# Patient Record
Sex: MALE | Race: WHITE | NOT HISPANIC OR LATINO | Employment: OTHER | URBAN - METROPOLITAN AREA
[De-identification: names, ages, dates, MRNs, and addresses within clinical notes are randomized per-mention and may not be internally consistent; named-entity substitution may affect disease eponyms.]

---

## 2017-01-27 ENCOUNTER — OFFICE VISIT (OUTPATIENT)
Dept: UROLOGY | Facility: CLINIC | Age: 77
End: 2017-01-27
Payer: MEDICARE

## 2017-01-27 VITALS — WEIGHT: 182 LBS

## 2017-01-27 DIAGNOSIS — C67.9 MALIGNANT NEOPLASM OF URINARY BLADDER, UNSPECIFIED SITE: Primary | ICD-10-CM

## 2017-01-27 LAB
BILIRUB SERPL-MCNC: NORMAL MG/DL
BLOOD URINE, POC: NORMAL
COLOR, POC UA: YELLOW
GLUCOSE UR QL STRIP: NORMAL
KETONES UR QL STRIP: NORMAL
LEUKOCYTE ESTERASE URINE, POC: NORMAL
NITRITE, POC UA: NORMAL
PH, POC UA: 7
PROTEIN, POC: NORMAL
SPECIFIC GRAVITY, POC UA: 1.01
UROBILINOGEN, POC UA: NORMAL

## 2017-01-27 PROCEDURE — 81002 URINALYSIS NONAUTO W/O SCOPE: CPT | Mod: S$GLB,,, | Performed by: UROLOGY

## 2017-01-27 PROCEDURE — 99499 UNLISTED E&M SERVICE: CPT | Mod: S$GLB,,, | Performed by: UROLOGY

## 2017-01-27 PROCEDURE — 99999 PR PBB SHADOW E&M-EST. PATIENT-LVL I: CPT | Mod: PBBFAC,,, | Performed by: UROLOGY

## 2017-01-27 PROCEDURE — 52000 CYSTOURETHROSCOPY: CPT | Mod: S$GLB,,, | Performed by: UROLOGY

## 2017-01-27 NOTE — MR AVS SNAPSHOT
[facility]  [address] [facility]  [place] [address]  Phone: [phone]  Fax: [phone]                  Lorenzo Arreguin   2017 10:40 AM   Office Visit    Description:  Male : 1940   Provider:  Carlitos Hernandez IV, MD   Department:  [facility]           Diagnoses this Visit        Comments    Malignant neoplasm of urinary bladder, unspecified site    -  Primary            To Do List           Future Appointments        Provider Department Dept Phone    2017 9:00 AM MD Nikolas Booth [phone]    2017 1:40 PM MD [facility] [phone]    2/15/2017 8:40 AM MD [facility] [phone]      Goals (5 Years of Data)     None      [facility]     [facility] On Call Nurse Care Line -  Assistance  Registered nurses in the [facility] On Call Center provide clinical advisement, health education, appointment booking, and other advisory services.  Call for this free service at [phone].             Medications           Message regarding Medications     Verify the changes and/or additions to your medication regime listed below are the same as discussed with your clinician today.  If any of these changes or additions are incorrect, please notify your healthcare provider.             Verify that the below list of medications is an accurate representation of the medications you are currently taking.  If none reported, the list may be blank. If incorrect, please contact your healthcare provider. Carry this list with you in case of emergency.           Current Medications     amlodipine (NORVASC) 10 MG tablet Take 10 mg by mouth once daily.    aspirin (ECOTRIN) 81 MG EC tablet Take 81 mg by mouth.    atorvastatin (LIPITOR) 40 MG tablet Take 40 mg by mouth.    budesonide-formoterol 160-4.5 mcg (SYMBICORT) 160-4.5 mcg/actuation HFAA Inhale 2 puffs into the lungs.    hydrALAZINE  (APRESOLINE) 25 MG tablet Take 25 mg by mouth 3 (three) times daily.    potassium gluconate 550 mg (90 mg) Tab Take 1 tablet by mouth.           Clinical Reference Information           Vital Signs - Last Recorded  Most recent update: 1/27/2017 10:16 AM by Minda Briceno, LPN    Wt                   82.6 kg (182 lb)           Allergies as of 1/27/2017     No Known Allergies      Immunizations Administered on Date of Encounter - 1/27/2017     None      Orders Placed During Today's Visit      Normal Orders This Visit    POCT urine dipstick without microscope       [facility] Sign-Up     Activating your [facility] account is as easy as 1-2-3!     1) Visit [email or web address], select Sign Up Now, enter this activation code and your date of birth, then select Next.  [email or web address]  Expires: 3/13/2017 10:44 AM      2) Create a username and password to use when you visit [facility] in the future and select a security question in case you lose your password and select Next.    3) Enter your e-mail address and click Sign Up!    Additional Information  If you have questions, please e-mail [email or web address] or call [phone] to talk to our [facility] staff. Remember, [facility] is NOT to be used for urgent needs. For medical emergencies, dial 911.

## 2017-01-27 NOTE — PROGRESS NOTES
"Chief Complaint: High Grade T1.    HPI:   1/27/17: 9 mo cysto today normal.  11/14/16: BCG 3/3 today.  10/24/16: 6 mo cysto today normal.  10/19/16: 75 yo man seen by Dr. Amish Hassan at [facility] and here for another opinion.  Evidently a mass was observed on his bladder. CT from 3/16 says "Nodular thickening in the lateral wall of the urinary bladder. 8 centimeter dense mass in the bladder on prior exam 02/09/2016 is no longer apparent. "  Had TURBT 3/16 and again 4/16 with both showing High grade T1. He has had BCG also, six rounds.  No followup there since.  No abd/pelvic pain and no exac/rel factors.  No hematuria.  No recent urolithiasis.  No urinary bother.  Not taking tamsulosin anymore it ws temporary.  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    Allergies:  Review of patient's allergies indicates no known allergies.    Medications:  has a current medication list which includes the following prescription(s): amlodipine, aspirin, atorvastatin, budesonide-formoterol 160-4.5 mcg, hydralazine, and potassium gluconate.    Review of Systems:  General: No fever, chills, fatigability, or weight loss.  Skin: No rashes, itching, or changes in color or texture of skin.  Chest: Denies HU, cyanosis, wheezing, cough, and sputum production.  Abdomen: Appetite fine. No weight loss. Denies diarrhea, abdominal pain, hematemesis, or blood in stool.  Musculoskeletal: No joint stiffness or swelling. Denies back pain.  : As above.  All other review of systems negative.    PMH:   has a past medical history of Bladder cancer; HTN (hypertension); Hyperlipidemia; MI, old; and Skin cancer.    PSH:   has a past surgical history that includes turbt; Right IH repair; and Skin cancer excision.    FamHx: family history is not on file.    SocHx:  reports that he has quit smoking. He has quit using smokeless tobacco. His alcohol and drug histories are not on file.      Physical Exam:  There were no vitals filed " for this visit.  General: A&Ox3, no apparent distress, no deformities  Neck: No masses, normal thyroid  Lungs: normal inspiration, no use of accessory muscles  Heart: normal pulse, no arrhythmias  Abdomen: Soft, NT, ND  Skin: The skin is warm and dry. No jaundice.  Ext: No c/c/e.  : deferred    Labs/Studies: Urinalysis performed in clinic, summary: UA normal    Procedure: Diagnostic Cystoscopy    Procedure in Detail: After proper consents were obtained, the patient was prepped and draped in normal sterile fashion for diagnostic cystoscopy. 5 ml of lidocaine jelly was instilled in the urethra. The flexible cystoscope was then introduced into the urethra, and advanced into the bladder under direct vision. The urethral mucosa appeared normal, and no strictures were noted. The sphincter appeared to be normal, and the veru montanum was unremarkable. The prostatic mucosa and the lateral lobes of the prostate were normal. The bladder neck was normal. Inspection of the interior of the bladder was then carried out. The trigone was unremarkable, with no mucosal lesions. The ureteral orifices were normal in position and configuration. Systematic inspection of the mucosa of the bladder it was then carried out, rotating the cystoscope so that all areas of the left and right lateral walls, the dome of the bladder, and the posterior wall were all visualized. The cystoscope was then advanced further into the bladder, and maximum deflection of the scope was performed so that the bladder neck could be inspected. No mucosal lesions were noted there. The cystoscope was then removed, and the procedure terminated.     Findings: normal cysto    Impression/Plan:   1. 9 mo cysto normal.  Next cysto 4/24/17.  2. BCG 1/3 next week.

## 2017-02-01 ENCOUNTER — OFFICE VISIT (OUTPATIENT)
Dept: UROLOGY | Facility: CLINIC | Age: 77
End: 2017-02-01
Payer: MEDICARE

## 2017-02-01 VITALS — DIASTOLIC BLOOD PRESSURE: 78 MMHG | SYSTOLIC BLOOD PRESSURE: 128 MMHG | WEIGHT: 182 LBS

## 2017-02-01 DIAGNOSIS — C67.9 MALIGNANT NEOPLASM OF URINARY BLADDER, UNSPECIFIED SITE: Primary | ICD-10-CM

## 2017-02-01 LAB
BILIRUB SERPL-MCNC: NORMAL MG/DL
BLOOD URINE, POC: NORMAL
COLOR, POC UA: YELLOW
GLUCOSE UR QL STRIP: NORMAL
KETONES UR QL STRIP: NORMAL
LEUKOCYTE ESTERASE URINE, POC: NORMAL
NITRITE, POC UA: NORMAL
PH, POC UA: 6
PROTEIN, POC: NORMAL
SPECIFIC GRAVITY, POC UA: 1.01
UROBILINOGEN, POC UA: NORMAL

## 2017-02-01 PROCEDURE — 51720 TREATMENT OF BLADDER LESION: CPT | Mod: S$GLB,,, | Performed by: UROLOGY

## 2017-02-01 PROCEDURE — 99499 UNLISTED E&M SERVICE: CPT | Mod: S$GLB,,, | Performed by: UROLOGY

## 2017-02-01 PROCEDURE — 81002 URINALYSIS NONAUTO W/O SCOPE: CPT | Mod: S$GLB,,, | Performed by: UROLOGY

## 2017-02-01 NOTE — MR AVS SNAPSHOT
[facility]  [address] [facility] [address]  [place] [address]  Phone: [phone]  Fax: [phone]                  Lorenzo Arreguin   2017 9:00 AM   Office Visit    Description:  Male : 1940   Provider:  Carlitos Hernandez IV, MD   Department:  [facility]           Diagnoses this Visit        Comments    Malignant neoplasm of urinary bladder, unspecified site    -  Primary            To Do List           Future Appointments        Provider Department Dept Phone    2017 1:40 PM MD Nikolas Booth IV  Urology [phone]    2/15/2017 8:40 AM MD [facility]I [facility] [phone]      Goals (5 Years of Data)     None      [facility]     [facility] - 24/7 Assistance  Registered nurses in the [facility] On Call Center provide clinical advisement, health education, appointment booking, and other advisory services.  Call for this free service at [phone].             Medications           Message regarding Medications     Verify the changes and/or additions to your medication regime listed below are the same as discussed with your clinician today.  If any of these changes or additions are incorrect, please notify your healthcare provider.        These medications were administered today        Dose Freq    BCG live (SHAWN) 50 mg in sodium chloride 0.9% 50 mL bladder instillation 50 mg Clinic/HOD 1 time    Sig: Instill 50 mg into the bladder one time.    Class: Normal    Route: Bladder Instillation           Verify that the below list of medications is an accurate representation of the medications you are currently taking.  If none reported, the list may be blank. If incorrect, please contact your healthcare provider. Carry this list with you in case of emergency.           Current Medications     amlodipine (NORVASC) 10 MG tablet Take 10 mg by mouth once daily.    aspirin (ECOTRIN) 81 MG EC tablet Take 81 mg by mouth.     atorvastatin (LIPITOR) 40 MG tablet Take 40 mg by mouth.    budesonide-formoterol 160-4.5 mcg (SYMBICORT) 160-4.5 mcg/actuation HFAA Inhale 2 puffs into the lungs.    hydrALAZINE (APRESOLINE) 25 MG tablet Take 25 mg by mouth 3 (three) times daily.    potassium gluconate 550 mg (90 mg) Tab Take 1 tablet by mouth.           Clinical Reference Information           Vital Signs - Last Recorded  Most recent update: 2/1/2017  9:06 AM by Minda Briceno LPN    BP Wt                128/78 82.6 kg (182 lb)          Blood Pressure          Most Recent Value    BP  128/78      Allergies as of 2/1/2017     No Known Allergies      Immunizations Administered on Date of Encounter - 2/1/2017     None      Orders Placed During Today's Visit      Normal Orders This Visit    POCT urine dipstick without microscope          2/1/2017  9:22 AM - Minda Briceno LPN      Component Results     Component    Color, UA    yellow    Spec Grav, UA    1.015    pH, UA    6    WBC, UA    neg    Nitrite, UA    neg    Protein, UA    neg    Glucose, UA    neg    Ketones, UA    neg    Urobilinogen, UA    neg    Bilirubin, UA    neg    Blood, UA    neg            Administrations This Visit     BCG live (SHAWN) 50 mg in sodium chloride 0.9% 50 mL bladder instillation     Admin Date Action Dose Route Administered By             02/01/2017 Given 50 mg Bladder Instillation Minda Briceno LPN                      [facility] Sign-Up     Activating your [facility] account is as easy as 1-2-3!     1) Visit [email or web address], select Sign Up Now, enter this activation code and your date of birth, then select Next.  [email or web address]  Expires: 3/13/2017 10:44 AM      2) Create a username and password to use when you visit [facility] in the future and select a security question in case you lose your password and select Next.    3) Enter your e-mail address and click Sign Up!    Additional Information  If you have questions, please e-mail  [email or web address] or call [phone] to talk to our [facility] staff. Remember, [facility] is NOT to be used for urgent needs. For medical emergencies, dial 911.

## 2017-02-01 NOTE — PROGRESS NOTES
"Chief Complaint: High Grade T1.    HPI:   1/31/17: BCG 1/3 today  1/27/17: 9 mo cysto today normal.  11/14/16: BCG 3/3 today.  10/24/16: 6 mo cysto today normal.  10/19/16: 75 yo man seen by Dr. Amish Hassan at [facility] and here for another opinion.  Evidently a mass was observed on his bladder. CT from 3/16 says "Nodular thickening in the lateral wall of the urinary bladder. 8 centimeter dense mass in the bladder on prior exam 02/09/2016 is no longer apparent. "  Had TURBT 3/16 and again 4/16 with both showing High grade T1. He has had BCG also, six rounds.  No followup there since.  No abd/pelvic pain and no exac/rel factors.  No hematuria.  No recent urolithiasis.  No urinary bother.  Not taking tamsulosin anymore it ws temporary.  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    Allergies:  Review of patient's allergies indicates no known allergies.    Medications:  has a current medication list which includes the following prescription(s): amlodipine, aspirin, atorvastatin, budesonide-formoterol 160-4.5 mcg, hydralazine, and potassium gluconate.    Review of Systems:  General: No fever, chills, fatigability, or weight loss.  Skin: No rashes, itching, or changes in color or texture of skin.  Chest: Denies HU, cyanosis, wheezing, cough, and sputum production.  Abdomen: Appetite fine. No weight loss. Denies diarrhea, abdominal pain, hematemesis, or blood in stool.  Musculoskeletal: No joint stiffness or swelling. Denies back pain.  : As above.  All other review of systems negative.    PMH:   has a past medical history of Bladder cancer; HTN (hypertension); Hyperlipidemia; MI, old; and Skin cancer.    PSH:   has a past surgical history that includes turbt; Right IH repair; and Skin cancer excision.    FamHx: family history is not on file.    SocHx:  reports that he has quit smoking. He has quit using smokeless tobacco. His alcohol and drug histories are not on file.      Physical " Exam:  There were no vitals filed for this visit.  General: A&Ox3, no apparent distress, no deformities  Neck: No masses, normal thyroid  Lungs: normal inspiration, no use of accessory muscles  Heart: normal pulse, no arrhythmias  Abdomen: Soft, NT, ND  Skin: The skin is warm and dry. No jaundice.  Ext: No c/c/e.  : deferred    Labs/Studies: Urinalysis performed in clinic, summary: UA normal    Impression/Plan:   1. 9 mo cysto normal.  Next cysto 4/24/17.  2. BCG 2/3 next week.

## 2017-02-01 NOTE — PROGRESS NOTES
....Patient in today for BCG treatment. One vial of BCG and 50ml perservative free sodium chloride 0.9% mixed as per instructions. Using aseptic technique, a fenestrated drape was placed over penis. Pt was catheterized using a #14Fr red rubber catheter to allow bladder emptying. Private area cleansed with betadine. Using closed system, 50 ml BCG instilled via gravity directly into bladder. Pt tolerated well. Instructed pt to keep BCG solution in bladder for 2 hours. Pt was given instructions to follow for the next 6 hours, including sitting on his toilet at home and using 2 cups of undiluted chlorine bleach and closing this lid. Pt was told to allow bleach to stand for 15 minutes before flushing toilet. He was also told to drink plenty of water to flush any remaining BCG bacteria. Patient verbalized understanding.

## 2017-02-08 ENCOUNTER — OFFICE VISIT (OUTPATIENT)
Dept: UROLOGY | Facility: CLINIC | Age: 77
End: 2017-02-08
Payer: MEDICARE

## 2017-02-08 VITALS — WEIGHT: 182 LBS

## 2017-02-08 DIAGNOSIS — C67.9 MALIGNANT NEOPLASM OF URINARY BLADDER, UNSPECIFIED SITE: Primary | ICD-10-CM

## 2017-02-08 LAB
BILIRUB SERPL-MCNC: NORMAL MG/DL
BLOOD URINE, POC: NORMAL
COLOR, POC UA: NORMAL
GLUCOSE UR QL STRIP: NORMAL
KETONES UR QL STRIP: NORMAL
LEUKOCYTE ESTERASE URINE, POC: NORMAL
NITRITE, POC UA: NORMAL
PH, POC UA: 6
PROTEIN, POC: NORMAL
SPECIFIC GRAVITY, POC UA: 1.01
UROBILINOGEN, POC UA: NORMAL

## 2017-02-08 PROCEDURE — 81002 URINALYSIS NONAUTO W/O SCOPE: CPT | Mod: S$GLB,,, | Performed by: UROLOGY

## 2017-02-08 PROCEDURE — 99999 PR PBB SHADOW E&M-EST. PATIENT-LVL II: CPT | Mod: PBBFAC,,, | Performed by: UROLOGY

## 2017-02-08 PROCEDURE — 99499 UNLISTED E&M SERVICE: CPT | Mod: S$GLB,,, | Performed by: UROLOGY

## 2017-02-08 PROCEDURE — 51720 TREATMENT OF BLADDER LESION: CPT | Mod: S$GLB,,, | Performed by: UROLOGY

## 2017-02-08 NOTE — MR AVS SNAPSHOT
[facility]  [address] [facility] [address]  [place] [address]  Phone: [phone]  Fax: [phone]                  Lorenzo Arreguin   2017 1:40 PM   Office Visit    Description:  Male : 1940   Provider:  Carlitos Hernandez IV, MD   Department:  [facility]           Diagnoses this Visit        Comments    Malignant neoplasm of urinary bladder, unspecified site    -  Primary            To Do List           Future Appointments        Provider Department Dept Phone    2017 2:40 PM Carlitos Hernandez IV, MD [facility] [phone]      Goals (5 Years of Data)     None      [facility]     [facility] Nurse Care Line -  Assistance  Registered nurses in the [facility] On Call Center provide clinical advisement, health education, appointment booking, and other advisory services.  Call for this free service at [phone].             Medications           Message regarding Medications     Verify the changes and/or additions to your medication regime listed below are the same as discussed with your clinician today.  If any of these changes or additions are incorrect, please notify your healthcare provider.        These medications were administered today        Dose Freq    BCG live (SHAWN) 50 mg in sodium chloride 0.9% 50 mL bladder instillation 50 mg Clinic/HOD 1 time    Sig: Instill 50 mg into the bladder one time.    Class: Normal    Route: Bladder Instillation           Verify that the below list of medications is an accurate representation of the medications you are currently taking.  If none reported, the list may be blank. If incorrect, please contact your healthcare provider. Carry this list with you in case of emergency.           Current Medications     amlodipine (NORVASC) 10 MG tablet Take 10 mg by mouth once daily.    aspirin (ECOTRIN) 81 MG EC tablet Take 81 mg by mouth.    atorvastatin (LIPITOR) 40 MG tablet Take 40 mg by mouth.     budesonide-formoterol 160-4.5 mcg (SYMBICORT) 160-4.5 mcg/actuation HFAA Inhale 2 puffs into the lungs.    hydrALAZINE (APRESOLINE) 25 MG tablet Take 25 mg by mouth 3 (three) times daily.    potassium gluconate 550 mg (90 mg) Tab Take 1 tablet by mouth.           Clinical Reference Information           Your Vitals Were     Weight                   82.6 kg (182 lb)           Allergies as of 2/8/2017     No Known Allergies      Immunizations Administered on Date of Encounter - 2/8/2017     None      Administrations This Visit     BCG live (SHAWN) 50 mg in sodium chloride 0.9% 50 mL bladder instillation     Admin Date Action Dose Route Administered By             02/08/2017 Given 50 mg Bladder Instillation Fariha Matias LPN                      [facility] Sign-Up     Activating your [facility] account is as easy as 1-2-3!     1) Visit [email or web address], select Sign Up Now, enter this activation code and your date of birth, then select Next.  [email or web address]  Expires: 3/13/2017 10:44 AM      2) Create a username and password to use when you visit [facility] in the future and select a security question in case you lose your password and select Next.    3) Enter your e-mail address and click Sign Up!    Additional Information  If you have questions, please e-mail [email or web address] or call [phone] to talk to our [facility] staff. Remember, [facility] is NOT to be used for urgent needs. For medical emergencies, dial 911.         Language Assistance Services     ATTENTION: Language assistance services are available, free of charge. Please call [phone].      ATENCIÓN: Si habla español, tiene a ballard disposición servicios gratuitos de asistencia lingüística. Llame al [phone].     CHÚ Ý: N?u b?n nói Ti?ng Vi?t, có các d?ch v? h? tr? ngôn ng? mi?n phí dành cho b?n. G?i s? [phone].         [facility] complies with applicable Federal civil rights laws and does not discriminate on the basis of  race, color, national origin, age, disability, or sex.

## 2017-02-08 NOTE — PROGRESS NOTES
"Chief Complaint: High Grade T1.    HPI:   1/31/17: BCG 2/3 today.  Inquires about finding a urologist closer to home in [place] - I mentioned Frank Dinh to him.  1/27/17: 9 mo cysto today normal.  11/14/16: BCG 3/3 today.  10/24/16: 6 mo cysto today normal.  10/19/16: 75 yo man seen by Dr. Amish Hassan at [facility] and here for another opinion.  Evidently a mass was observed on his bladder. CT from 3/16 says "Nodular thickening in the lateral wall of the urinary bladder. 8 centimeter dense mass in the bladder on prior exam 02/09/2016 is no longer apparent. "  Had TURBT 3/16 and again 4/16 with both showing High grade T1. He has had BCG also, six rounds.  No followup there since.  No abd/pelvic pain and no exac/rel factors.  No hematuria.  No recent urolithiasis.  No urinary bother.  Not taking tamsulosin anymore it ws temporary.  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    Allergies:  Review of patient's allergies indicates no known allergies.    Medications:  has a current medication list which includes the following prescription(s): amlodipine, aspirin, atorvastatin, budesonide-formoterol 160-4.5 mcg, hydralazine, and potassium gluconate.    Review of Systems:  General: No fever, chills, fatigability, or weight loss.  Skin: No rashes, itching, or changes in color or texture of skin.  Chest: Denies HU, cyanosis, wheezing, cough, and sputum production.  Abdomen: Appetite fine. No weight loss. Denies diarrhea, abdominal pain, hematemesis, or blood in stool.  Musculoskeletal: No joint stiffness or swelling. Denies back pain.  : As above.  All other review of systems negative.    PMH:   has a past medical history of Bladder cancer; HTN (hypertension); Hyperlipidemia; MI, old; and Skin cancer.    PSH:   has a past surgical history that includes turbt; Right IH repair; and Skin cancer excision.    FamHx: family history is not on file.    SocHx:  reports that he has quit smoking. He has quit " using smokeless tobacco. His alcohol and drug histories are not on file.      Physical Exam:  There were no vitals filed for this visit.  General: A&Ox3, no apparent distress, no deformities  Neck: No masses, normal thyroid  Lungs: normal inspiration, no use of accessory muscles  Heart: normal pulse, no arrhythmias  Abdomen: Soft, NT, ND  Skin: The skin is warm and dry. No jaundice.  Ext: No c/c/e.  : deferred    Labs/Studies: Urinalysis performed in clinic, summary: UA normal    Impression/Plan:   1. 9 mo cysto normal.  Next cysto 4/24/17.  2. BCG 3/3 two weeks.

## 2017-02-08 NOTE — PROGRESS NOTES
Patient came to clinic today for instillation of BCG (#2/3) per Dr. Hernandez. Using sterile technique, I instilled 1 vial of BCG intravesically via 14 fr chavira catheter. Instructed patient not to urinate for 2 hours and reminded patient of bleach protocol. Patient states understanding and tolerated well.

## 2017-02-22 ENCOUNTER — OFFICE VISIT (OUTPATIENT)
Dept: UROLOGY | Facility: CLINIC | Age: 77
End: 2017-02-22
Payer: MEDICARE

## 2017-02-22 VITALS — WEIGHT: 182 LBS

## 2017-02-22 DIAGNOSIS — C67.9 MALIGNANT NEOPLASM OF URINARY BLADDER, UNSPECIFIED SITE: Primary | ICD-10-CM

## 2017-02-22 PROCEDURE — 99999 PR PBB SHADOW E&M-EST. PATIENT-LVL I: CPT | Mod: PBBFAC,,, | Performed by: UROLOGY

## 2017-02-22 PROCEDURE — 51720 TREATMENT OF BLADDER LESION: CPT | Mod: S$GLB,,, | Performed by: UROLOGY

## 2017-02-22 PROCEDURE — 81002 URINALYSIS NONAUTO W/O SCOPE: CPT | Mod: S$GLB,,, | Performed by: UROLOGY

## 2017-02-22 PROCEDURE — 99499 UNLISTED E&M SERVICE: CPT | Mod: S$GLB,,, | Performed by: UROLOGY

## 2017-02-22 NOTE — MR AVS SNAPSHOT
[facility]  [address] [facility]  [place] [address]  Phone: [phone]  Fax: [phone]                  Lorenzo Arreguin   2017 2:40 PM   Office Visit    Description:  Male : 1940   Provider:  Carlitos Hernandez IV, MD   Department:  [facility]           Diagnoses this Visit        Comments    Malignant neoplasm of urinary bladder, unspecified site    -  Primary            To Do List           Goals (5 Years of Data)     None      [facility]     [facility] Care [facility] -  Assistance  Registered nurses in the [facility] Center provide clinical advisement, health education, appointment booking, and other advisory services.  Call for this free service at [phone].             Medications           Message regarding Medications     Verify the changes and/or additions to your medication regime listed below are the same as discussed with your clinician today.  If any of these changes or additions are incorrect, please notify your healthcare provider.        These medications were administered today        Dose Freq    BCG live (SHAWN) 50 mg in sodium chloride 0.9% 50 mL bladder instillation 50 mg Clinic/HOD 1 time    Sig: Instill 50 mg into the bladder one time.    Class: Normal    Route: Bladder Instillation           Verify that the below list of medications is an accurate representation of the medications you are currently taking.  If none reported, the list may be blank. If incorrect, please contact your healthcare provider. Carry this list with you in case of emergency.           Current Medications     amlodipine (NORVASC) 10 MG tablet Take 10 mg by mouth once daily.    aspirin (ECOTRIN) 81 MG EC tablet Take 81 mg by mouth.    atorvastatin (LIPITOR) 40 MG tablet Take 40 mg by mouth.    budesonide-formoterol 160-4.5 mcg (SYMBICORT) 160-4.5 mcg/actuation HFAA Inhale 2 puffs into the lungs.    hydrALAZINE (APRESOLINE) 25 MG tablet Take 25 mg by  mouth 3 (three) times daily.    potassium gluconate 550 mg (90 mg) Tab Take 1 tablet by mouth.           Clinical Reference Information           Your Vitals Were     Weight                   82.6 kg (182 lb)           Allergies as of 2/22/2017     No Known Allergies      Immunizations Administered on Date of Encounter - 2/22/2017     None      Orders Placed During Today's Visit      Normal Orders This Visit    POCT urine dipstick without microscope       [facility] Sign-Up     Activating your [facility] account is as easy as 1-2-3!     1) Visit [email or web address], select Sign Up Now, enter this activation code and your date of birth, then select Next.  [email or web address]  Expires: 3/13/2017 10:44 AM      2) Create a username and password to use when you visit [facility] in the future and select a security question in case you lose your password and select Next.    3) Enter your e-mail address and click Sign Up!    Additional Information  If you have questions, please e-mail [email or web address] or call [phone] to talk to our [facility] staff. Remember, [facility] is NOT to be used for urgent needs. For medical emergencies, dial 911.         Language Assistance Services     ATTENTION: Language assistance services are available, free of charge. Please call [phone].      ATENCIÓN: Si habla español, tiene a ballard disposición servicios gratuitos de asistencia lingüística. Llame al [phone].     CHÚ Ý: N?u b?n nói Ti?ng Vi?t, có các d?ch v? h? tr? ngôn ng? mi?n phí dành cho b?n. G?i s? [phone].         [facility] complies with applicable Federal civil rights laws and does not discriminate on the basis of race, color, national origin, age, disability, or sex.

## 2017-02-22 NOTE — PROGRESS NOTES
"Chief Complaint: High Grade T1.    HPI:   2/22/17: BCG 3/3 today (scheduling issue drug it out a bit).  1/31/17: BCG 2/3 today.  Inquires about finding a urologist closer to home in [place] - I mentioned Frank Dinh to him.  1/27/17: 9 mo cysto today normal.  11/14/16: BCG 3/3 today.  10/24/16: 6 mo cysto today normal.  10/19/16: 77 yo man seen by Dr. Amish Hassan at [facility] and here for another opinion.  Evidently a mass was observed on his bladder. CT from 3/16 says "Nodular thickening in the lateral wall of the urinary bladder. 8 centimeter dense mass in the bladder on prior exam 02/09/2016 is no longer apparent. "  Had TURBT 3/16 and again 4/16 with both showing High grade T1. He has had BCG also, six rounds.  No followup there since.  No abd/pelvic pain and no exac/rel factors.  No hematuria.  No recent urolithiasis.  No urinary bother.  Not taking tamsulosin anymore it ws temporary.  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    Allergies:  Review of patient's allergies indicates no known allergies.    Medications:  has a current medication list which includes the following prescription(s): amlodipine, aspirin, atorvastatin, budesonide-formoterol 160-4.5 mcg, hydralazine, and potassium gluconate.    Review of Systems:  General: No fever, chills, fatigability, or weight loss.  Skin: No rashes, itching, or changes in color or texture of skin.  Chest: Denies HU, cyanosis, wheezing, cough, and sputum production.  Abdomen: Appetite fine. No weight loss. Denies diarrhea, abdominal pain, hematemesis, or blood in stool.  Musculoskeletal: No joint stiffness or swelling. Denies back pain.  : As above.  All other review of systems negative.    PMH:   has a past medical history of Bladder cancer; HTN (hypertension); Hyperlipidemia; MI, old; and Skin cancer.    PSH:   has a past surgical history that includes turbt; Right IH repair; and Skin cancer excision.    FamHx: family history is not on " file.    SocHx:  reports that he has quit smoking. He has quit using smokeless tobacco. His alcohol and drug histories are not on file.      Physical Exam:  There were no vitals filed for this visit.  General: A&Ox3, no apparent distress, no deformities  Neck: No masses, normal thyroid  Lungs: normal inspiration, no use of accessory muscles  Heart: normal pulse, no arrhythmias  Abdomen: Soft, NT, ND  Skin: The skin is warm and dry. No jaundice.  Ext: No c/c/e.  : deferred    Labs/Studies: Urinalysis performed in clinic, summary: UA normal    Impression/Plan:   1. 9 mo cysto normal.  Next cysto 4/24/17.  2. BCG 3/3 for 9 mo round completed.  3. Has an appt with Dr. Dinh - he will followup with us as needed going forward.  4. Copy of this note to Dr. Dinh.

## 2017-02-22 NOTE — PROGRESS NOTES
....Patient in today for BCG treatment. One vial of BCG and 50ml perservative free sodium chloride 0.9% mixed as per instructions. Using sterile technique, Pt was catheterized using a #14Fr red rubber catheter to allow bladder emptying. Private area cleansed with betadine. Using closed system, 50 ml BCG instilled via gravity directly into bladder. Pt tolerated well. Instructed pt to keep BCG solution in bladder for 2 hours. Pt was given instructions to follow for the next 6 hours, including sitting on his toilet at home and using 2 cups of undiluted chlorine bleach and closing this lid. Pt was told to allow bleach to stand for 15 minutes before flushing toilet. He was also told to drink plenty of water to flush any remaining BCG bacteria. Patient verbalized understanding.

## 2018-05-25 ENCOUNTER — TELEPHONE (OUTPATIENT)
Dept: DERMATOLOGY | Facility: CLINIC | Age: 78
End: 2018-05-25

## 2018-05-25 NOTE — TELEPHONE ENCOUNTER
----- Message from Janet Gutierrez sent at 5/25/2018 10:24 AM CDT -----  Contact: pt at [phone]  [facility] pt-Adriana-Pt is ready to set up his appt .  Pt states that his biopsy is with you.  Pt states th at the best time to reach  After 0400 around lunch time.  Pt is alone and outside most of the time.  Dr. Chang is referring in [place].  Mirna Trujillo.

## 2018-05-29 ENCOUNTER — TELEPHONE (OUTPATIENT)
Dept: DERMATOLOGY | Facility: CLINIC | Age: 78
End: 2018-05-29

## 2018-05-29 NOTE — TELEPHONE ENCOUNTER
----- Message from Janet Gutierrez sent at 5/29/2018 10:07 AM CDT -----  Contact: pt at [phone]  [facility] pt_Pam-Please call pt today.  He is calling in ref to setting up an appt.  Has been waiting.  ThanksNeeds Advice    Reason for call: setting up an appt     Communication Preference:calll/today  Additional Information:

## 2020-04-09 ENCOUNTER — OUTSIDE PLACE OF SERVICE (OUTPATIENT)
Dept: ADMINISTRATIVE | Facility: OTHER | Age: 80
End: 2020-04-09
Payer: MEDICARE

## 2020-04-09 PROCEDURE — 99223 PR INITIAL HOSPITAL CARE,LEVL III: ICD-10-PCS | Mod: ,,, | Performed by: THORACIC SURGERY (CARDIOTHORACIC VASCULAR SURGERY)

## 2020-04-09 PROCEDURE — 99223 1ST HOSP IP/OBS HIGH 75: CPT | Mod: ,,, | Performed by: THORACIC SURGERY (CARDIOTHORACIC VASCULAR SURGERY)

## 2020-04-11 ENCOUNTER — OUTSIDE PLACE OF SERVICE (OUTPATIENT)
Dept: ADMINISTRATIVE | Facility: OTHER | Age: 80
End: 2020-04-11
Payer: MEDICARE

## 2020-04-11 PROCEDURE — 99232 SBSQ HOSP IP/OBS MODERATE 35: CPT | Mod: ,,, | Performed by: THORACIC SURGERY (CARDIOTHORACIC VASCULAR SURGERY)

## 2020-04-11 PROCEDURE — 99232 PR SUBSEQUENT HOSPITAL CARE,LEVL II: ICD-10-PCS | Mod: ,,, | Performed by: THORACIC SURGERY (CARDIOTHORACIC VASCULAR SURGERY)

## 2020-04-13 ENCOUNTER — OUTSIDE PLACE OF SERVICE (OUTPATIENT)
Dept: ADMINISTRATIVE | Facility: OTHER | Age: 80
End: 2020-04-13
Payer: MEDICARE

## 2020-04-13 PROCEDURE — 35301 RECHANNELING OF ARTERY: CPT | Mod: LT,,, | Performed by: THORACIC SURGERY (CARDIOTHORACIC VASCULAR SURGERY)

## 2020-04-13 PROCEDURE — 35301 PR THROMBOENDARTECTMY NECK,NECK INCIS: ICD-10-PCS | Mod: LT,,, | Performed by: THORACIC SURGERY (CARDIOTHORACIC VASCULAR SURGERY)

## 2020-04-14 ENCOUNTER — OUTSIDE PLACE OF SERVICE (OUTPATIENT)
Dept: ADMINISTRATIVE | Facility: OTHER | Age: 80
End: 2020-04-14
Payer: MEDICARE

## 2020-04-14 PROCEDURE — 99024 POSTOP FOLLOW-UP VISIT: CPT | Mod: ,,, | Performed by: THORACIC SURGERY (CARDIOTHORACIC VASCULAR SURGERY)

## 2020-04-14 PROCEDURE — 99024 PR POST-OP FOLLOW-UP VISIT: ICD-10-PCS | Mod: ,,, | Performed by: THORACIC SURGERY (CARDIOTHORACIC VASCULAR SURGERY)

## 2020-04-23 ENCOUNTER — TELEPHONE (OUTPATIENT)
Dept: VASCULAR SURGERY | Facility: CLINIC | Age: 80
End: 2020-04-23

## 2020-04-23 NOTE — TELEPHONE ENCOUNTER
----- Message from Princess GEORGETTE Hogan sent at 4/23/2020 11:44 AM CDT -----  Contact: Brunilda Spicer  Type: Needs Medical Advice  Who Called:Brunilda Spicer  Best Call Back Number   Additional Information Requesting to speak with someone in regards to stitches he has on his head that he is requesting to be removed. Leslie is needing order in order to do the removal. Please call to assist.

## 2020-04-24 NOTE — TELEPHONE ENCOUNTER
Phone number given for patient not  nurse that is calling. Patient notified that he does not have any sutures in his neck from carotid endarterectomy. He will follow-up on May 12 in [place] at 3:30.

## 2020-05-12 ENCOUNTER — OFFICE VISIT (OUTPATIENT)
Dept: CARDIAC SURGERY | Facility: CLINIC | Age: 80
End: 2020-05-12
Payer: MEDICARE

## 2020-05-12 VITALS
SYSTOLIC BLOOD PRESSURE: 141 MMHG | HEART RATE: 69 BPM | BODY MASS INDEX: 27.64 KG/M2 | WEIGHT: 186.63 LBS | HEIGHT: 69 IN | DIASTOLIC BLOOD PRESSURE: 82 MMHG

## 2020-05-12 DIAGNOSIS — I10 ESSENTIAL HYPERTENSION: ICD-10-CM

## 2020-05-12 DIAGNOSIS — I63.239 CAROTID ARTERY STENOSIS WITH CEREBRAL INFARCTION: ICD-10-CM

## 2020-05-12 PROCEDURE — 99024 PR POST-OP FOLLOW-UP VISIT: ICD-10-PCS | Mod: S$GLB,,, | Performed by: THORACIC SURGERY (CARDIOTHORACIC VASCULAR SURGERY)

## 2020-05-12 PROCEDURE — 99024 POSTOP FOLLOW-UP VISIT: CPT | Mod: S$GLB,,, | Performed by: THORACIC SURGERY (CARDIOTHORACIC VASCULAR SURGERY)

## 2020-05-12 PROCEDURE — 99999 PR PBB SHADOW E&M-EST. PATIENT-LVL III: ICD-10-PCS | Mod: PBBFAC,,, | Performed by: THORACIC SURGERY (CARDIOTHORACIC VASCULAR SURGERY)

## 2020-05-12 PROCEDURE — 99999 PR PBB SHADOW E&M-EST. PATIENT-LVL III: CPT | Mod: PBBFAC,,, | Performed by: THORACIC SURGERY (CARDIOTHORACIC VASCULAR SURGERY)

## 2020-05-12 NOTE — PROGRESS NOTES
"Subjective:       Lorenzo Arreguin presents to the clinic after undergoing left carotid endarterectomy for treatment of severe left carotid stenosis with cerebral infarction.  He had had some weakness of his right hand prior to surgery.  His surgical intervention went without complications, and his right hand weakness has markedly improved and essentially resolved.  He has no difficulty with speech at this time either.       Objective:      BP (!) 141/82   Pulse 69   Ht 5' 8.5" (1.74 m)   Wt 84.6 kg (186 lb 9.6 oz)   BMI 27.96 kg/m²     Objective  General:  He appears well.  Neck:  Soft and flat.  He has healing exceptionally well.  HEENT:  There is good facial symmetry.  Tongue protrudes in midline.  Neurologic:  Alert and oriented x4.  There are no focal deficits.  There is excellent strength/motor function both upper extremities.      NEUROLOGIC STUDIES  CT Angiogram Neck With Contrast [[id number]] Collected: 04/09/2020 1312   Updated: 04/09/2020 1327   Narrative:   REASON FOR EXAM: Carotid stenosis, follow up     TECHNICAL FACTORS: Intravenous contrast images were obtained of the neck with image postprocessing, including 3-D volume rendering reconstruction. Non-intravenous contrast  images were obtained. Images are stored in the patient's permanent record.   Automated exposure control was utilized for radiation dose reduction.     DOSE: 70 mL Isovue-370    COMPARISON: April 8, 2020    FINDINGS: There is severe calcified atherosclerotic disease at the origin of the left internal carotid artery resulting in greater than 70% diameter reduction. There is moderate calcified atherosclerotic disease of the origin of the right internal   carotid artery with no obvious diameter reduction. The left vertebral artery is dominant. The right vertebral artery functionally terminates in PICA. There is calcified atherosclerotic disease of the origin of the left vertebral artery without obvious   stenosis. Vertebral " arteries are widely patent bilaterally. The subclavian arteries are patent without significant stenosis. There is mild centrilobular emphysematous disease in the upper lobes bilaterally.    Evaluation of the internal carotid arteries for determining clinically significant stenosis was performed by comparing the diameters of the proximal and distal internal carotid arteries.    IMPRESSION:  Severe calcified atherosclerotic disease at the origin of the left internal carotid artery resulting in greater than 70% diameter reduction.    Electronically signed by Roge Navas MD on 4/9/2020 1:24 PM    US Carotid Bilateral [[id number]] Collected: 04/08/2020 1844   Updated: 04/08/2020 1901   Narrative:   REASON FOR EXAM: stroke     TECHNICAL FACTORS: Using a linear high-frequency vascular ultrasound transducer, transverse and longitudinal gray-scale images are obtained of the extracranial carotid system. B-mode, color Doppler and spectral Doppler images of the CCA, ICA, ECA, and   vertebral arteries are included. Technical quality of exam is adequate.    TECHNOLOGIST: Arabella Ramos    COMPARISON: None    RIGHT CAROTID FINDINGS:  There is moderate atheromatous plaquing. The Doppler findings are normal.  ICA Prox PS: 41.7 cm/s  ICA Prox ED: 16.4 cm/s  ICA Mid PS: 52.0 cm/s  ICA Mid ED: 16.3 cm/s  ICA Dist PS: 46.3 cm/s  ICA Dist ED: 15.9 cm/s  CCA Mid PS: 64.9 cm/s  CCA Mid ED: 16.2 cm/s  ECA PS: 72.8 cm/s  Vert PS: 39.1 cm/s Antegrade flow.   ICA/CCA PS Ratio: 0.8    LEFT CAROTID FINDINGS:  There is moderate to severe atheromatous plaquing. The Doppler findings are normal.  ICA Prox PS: 268.0 cm/s  ICA Prox ED: 59.3 cm/s  ICA Mid PS: 104.8 cm/s  ICA Mid ED: 15.6 cm/s  ICA Dist PS: 63.8 cm/s  ICA Dist ED: 14.9 cm/s  CCA Mid PS: 51.1 cm/s  CCA Mid ED: 9.2 cm/s  ECA PS: 203.6 cm/s  Vert PS: 46.8 cm/s Antegrade flow.   ICA/CCA PS Ratio: 5.2    Comparison of the proximal internal carotid artery diameter to the distal internal  carotid artery diameter bilaterally was performed.     IMPRESSION:  1. Moderate to severe atheromatous plaquing on the left with findings consistent with a greater than 70% stenosis of the proximal internal carotid artery.  2. Moderate atheromatous plaquing on the right without evidence of flow-limiting stenosis.     Electronically signed by Yair Ham MD on 4/8/2020 6:58 PM           Assessment:      Doing well postoperatively.  He has had resolution of his stroke symptoms       Plan:      1. Continue any current medications.  2. Wound care discussed.  3. Pt is to increase activities as tolerated.  4. Follow up: 1 year with carotid ultrasound.  This can be done with Dr. Zapien before I see him.

## 2021-06-28 ENCOUNTER — TELEPHONE (OUTPATIENT)
Dept: VASCULAR SURGERY | Facility: CLINIC | Age: 81
End: 2021-06-28

## 2022-03-08 ENCOUNTER — TELEPHONE (OUTPATIENT)
Dept: VASCULAR SURGERY | Facility: CLINIC | Age: 82
End: 2022-03-08
Payer: MEDICARE

## 2022-03-08 DIAGNOSIS — I63.239 CAROTID ARTERY STENOSIS WITH CEREBRAL INFARCTION: Primary | ICD-10-CM

## 2022-03-08 DIAGNOSIS — I65.23 BILATERAL CAROTID ARTERY STENOSIS: ICD-10-CM

## 2022-03-08 NOTE — TELEPHONE ENCOUNTER
----- Message from Keke Catalan sent at 3/8/2022  9:20 AM CST -----  Type:  Sooner Apoointment Request    Caller is requesting a sooner appointment.  Caller declined first available appointment listed below.  Caller will not accept being placed on the waitlist and is requesting a message be sent to doctor.    Name of Caller:  patient   When is the first available appointment?  Unavailable   Symptoms:  follow up/received notice   Best Call Back Number:  [phone]   Additional Information:  please advise-thank you

## 2022-03-21 ENCOUNTER — HOSPITAL ENCOUNTER (OUTPATIENT)
Dept: RADIOLOGY | Facility: HOSPITAL | Age: 82
Discharge: HOME | End: 2022-03-21
Attending: THORACIC SURGERY (CARDIOTHORACIC VASCULAR SURGERY)
Payer: MEDICARE

## 2022-03-21 DIAGNOSIS — I65.23 BILATERAL CAROTID ARTERY STENOSIS: ICD-10-CM

## 2022-03-21 PROCEDURE — 93880 EXTRACRANIAL BILAT STUDY: CPT | Mod: TC,PO

## 2022-03-21 PROCEDURE — 93880 EXTRACRANIAL BILAT STUDY: CPT | Mod: 26,,, | Performed by: RADIOLOGY

## 2022-03-21 PROCEDURE — 93880 US CAROTID BILATERAL: ICD-10-PCS | Mod: 26,,, | Performed by: RADIOLOGY

## 2022-03-22 ENCOUNTER — OFFICE VISIT (OUTPATIENT)
Dept: VASCULAR SURGERY | Facility: CLINIC | Age: 82
End: 2022-03-22
Payer: MEDICARE

## 2022-03-22 DIAGNOSIS — I63.239 CAROTID ARTERY STENOSIS WITH CEREBRAL INFARCTION: Primary | ICD-10-CM

## 2022-03-22 PROCEDURE — 99441 PR PHYSICIAN TELEPHONE EVALUATION 5-10 MIN: CPT | Mod: 95,,, | Performed by: THORACIC SURGERY (CARDIOTHORACIC VASCULAR SURGERY)

## 2022-03-22 PROCEDURE — 1159F PR MEDICATION LIST DOCUMENTED IN MEDICAL RECORD: ICD-10-PCS | Mod: CPTII,95,, | Performed by: THORACIC SURGERY (CARDIOTHORACIC VASCULAR SURGERY)

## 2022-03-22 PROCEDURE — 1160F PR REVIEW ALL MEDS BY PRESCRIBER/CLIN PHARMACIST DOCUMENTED: ICD-10-PCS | Mod: CPTII,95,, | Performed by: THORACIC SURGERY (CARDIOTHORACIC VASCULAR SURGERY)

## 2022-03-22 PROCEDURE — 1159F MED LIST DOCD IN RCRD: CPT | Mod: CPTII,95,, | Performed by: THORACIC SURGERY (CARDIOTHORACIC VASCULAR SURGERY)

## 2022-03-22 PROCEDURE — 99441 PR PHYSICIAN TELEPHONE EVALUATION 5-10 MIN: ICD-10-PCS | Mod: 95,,, | Performed by: THORACIC SURGERY (CARDIOTHORACIC VASCULAR SURGERY)

## 2022-03-22 PROCEDURE — 1160F RVW MEDS BY RX/DR IN RCRD: CPT | Mod: CPTII,95,, | Performed by: THORACIC SURGERY (CARDIOTHORACIC VASCULAR SURGERY)

## 2022-03-22 NOTE — PROGRESS NOTES
Established Patient - Audio Only Telehealth Visit     The patient location is:  Home  The chief complaint leading to consultation is:  History of carotid artery stenosis  Visit type: Virtual visit with audio only (telephone)  Total time spent with patient:  9 minutes       The reason for the audio only service rather than synchronous audio and video virtual visit was related to technical difficulties or patient preference/necessity.     Each patient to whom I provide medical services by telemedicine is:  (1) informed of the relationship between the physician and patient and the respective role of any other health care provider with respect to management of the patient; and (2) notified that they may decline to receive medical services by telemedicine and may withdraw from such care at any time. Patient verbally consented to receive this service via voice-only telephone call.       HPI:  Patient is an 82-year-old white male who underwent left carotid endarterectomy on 04/13/2020 for treatment of severe left internal carotid artery stenosis.  He has no amaurosis fugax and no lateralizing neurologic complaints currently.      I reviewed the results of the patient's carotid ultrasound which are as follows:  US CAROTID BILATERAL     CLINICAL HISTORY:  Occlusion and stenosis of bilateral carotid arteries     TECHNIQUE:  Grayscale and color Doppler ultrasound examination of the carotid and vertebral artery systems bilaterally.  Stenosis estimates are per the NASCET measurement criteria.     COMPARISON:  None.     FINDINGS:  Right:     Internal Carotid Artery (ICA) peak systolic velocity 116 cm/sec     ICA/CCA peak systolic velocity ratio: 1.6     Plaque formation: Moderate homogeneous plaque at the bifurcation     Vertebral artery: Antegrade flow and normal waveform.     Left:     Internal Carotid Artery (ICA)  peak systolic velocity 69 cm/sec     ICA/CCA peak systolic velocity ratio: 1.2     Plaque formation: Status post  endarterectomy.  No significant plaque.     Vertebral artery: Antegrade flow and normal waveform.     Impression:     No evidence of a hemodynamically significant carotid bifurcation stenosis.  Status post left carotid endarterectomy.        Electronically signed by: Brayan Luu MD  Date:                                            03/21/2022  Time:                                           08:51       Assessment and plan:    1. History of left carotid endarterectomy for severe carotid stenosis.  2. No sign of recurrent carotid artery disease by ultrasound.    PLAN:  Continue current medical management.  Follow with me in 1 year with carotid ultrasound.                        This service was not originating from a related E/M service provided within the previous 7 days nor will  to an E/M service or procedure within the next 24 hours or my soonest available appointment.  Prevailing standard of care was able to be met in this audio-only visit.